# Patient Record
(demographics unavailable — no encounter records)

---

## 2025-05-12 NOTE — HISTORY OF PRESENT ILLNESS
[FreeTextEntry1] : This is a 54 year old female s/p left u/s biopsy on2017 for 2 new hypoechoic lesions. Pathology of left 12:00 N2 was a fibroadenoma, adenosis and CCC. Pathology of 12:00 N3 showed CCC, CC hyperplasia and PASH. Patient s/p right ebb 11/19/15 pathology shows benign breast tissue with healing biopsy site, adenosis and ccc, no atypia.  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 She just moved to [place] from [place]. She had her bx on 7/2/15 at [facility]. Pathology showed a focus of ADH, FCC, florid ductal hyperplasia, CCC, no in situ or invasive malignancy. She had bilateral screening mammogram 11 and then did not have imaging again until 2015. During her 6/30/15 right diagnostic mammogram in the right breast 11:00 4.5cm FN there were clustered and segmental calcifications and biopsy was recommended. In 2020 right 1:00 MR abnormal enhancement was seen. This was not biopsied due to insurance reasons and follow up MRI was normal.  She is s/p right LOQ posterior (stoplight clip)  and anterior with calcifications, pathology showed posterior  Ductal Carcinoma in-situ, nuclear grade 2, cribriform pattern with necrosis and rare microcalcification spanning at least 5mm, and CCC with calcification. ER positive, VA positive anterior site was negative (barbell clip). She is s/p Right breast upper outer quadrant stereotactic biopsy (tophat clip) 2022, pathology showed Radial scar, microcalcification associated with CCC, and duct ectasia.  She is s/p bilateral nipple delay 2022 pathology is benign. She had postop rash along areas of adhesive and now along IMF incisions.  She is s/p bilateral NSM and TE (3/11/2022) pathology showed right LOQ DCIS 4cm, grade 2, <1mm anterior-superior and posterior margins ER positive %, VA 1-10% weak to moderate staining; left CPM benign. She is had implant exchange . She is s/p implant exchange with fat transfer with Dr. Bonds 22.  She was adopted but recently found her biological mother via [employer] testing, she will meet her mother 2025 in [place] - she also has a personal history of breast cancer at 50s.  She does SBE.  She has not noticed a change in her breast or a breast lump.  She has not noticed a change in her nipple or nipple area. She has not noticed a change in the skin of the breast. She is not experiencing nipple discharge. She is not experiencing breast pain.  She has not noticed a lump or lymph node under the armpit.   BREAST CANCER RISK FACTORS Menarche:  12 Date of LMP: spring 2022  Menopause: chivo Grav:  2    Para:  2 (15, 12) Age at first live birth: 38 Nursed: yes Hysterectomy: no Oophorectomy: no OCP: y 10y HRT: no Last pap/pelvic exam: 2024 WNL  Related family history: adopted, biologic mother breast cancer 50s Ashkenazi: unknown Tyrer-Daron/NCI lifetime risk: TCv7 42.0%, TC v6 39%, BRCAPRO 11.2%, Mahsa 28.1%   BRCA testin invitae negative  Bra size: 34A  Last mammogram: 2021 right (bilat 2021)    Location: [place] Report reviewed.                                                            Images reviewed On PACS Results: Birads 4B Extremely dense breast tissue. Fine pleomorphic are present at the anterior central lower outer aspect of the right breast and amorphous microcalcifications are present at the posterior lower aspect of the right breast. Representative stereotactic biopsy is recommended at these sites to determine histology and exclude malignancy.  Additional microcalcifications are present at the UOA of the right breast that are likely benign.    Last ultrasound: 2021             Location: [place] Report reviewed.                                 Images reviewed. Results: Birads 2 no sonographic evidence of malignancy bilaterally.  Last MRI: 2022          Location: [facility] Report reviewed.                 Results: BIRADS 6 At the lower outer aspect of the right breast at the site of biopsy showing DCIA, there is a metallic marker and patchy enhancement consistent with postbiopsy change and pathology findings. Further surgical consultation is advised.  At the site of recent stereo biopsy at the UO aspect of the right breast postbiopsy hematoma and postbiopsy change is present.  No additional suspicious area of abnormal enhancement in the remainders of the breast bilaterally. 
[FreeTextEntry1] : This is a 54 year old female s/p left u/s biopsy on2017 for 2 new hypoechoic lesions. Pathology of left 12:00 N2 was a fibroadenoma, adenosis and CCC. Pathology of 12:00 N3 showed CCC, CC hyperplasia and PASH. Patient s/p right ebb 11/19/15 pathology shows benign breast tissue with healing biopsy site, adenosis and ccc, no atypia.  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 She just moved to [place] from [place]. She had her bx on 7/2/15 at [facility]. Pathology showed a focus of ADH, FCC, florid ductal hyperplasia, CCC, no in situ or invasive malignancy. She had bilateral screening mammogram 11 and then did not have imaging again until 2015. During her 6/30/15 right diagnostic mammogram in the right breast 11:00 4.5cm FN there were clustered and segmental calcifications and biopsy was recommended. In 2020 right 1:00 MR abnormal enhancement was seen. This was not biopsied due to insurance reasons and follow up MRI was normal.  She is s/p right LOQ posterior (stoplight clip)  and anterior with calcifications, pathology showed posterior  Ductal Carcinoma in-situ, nuclear grade 2, cribriform pattern with necrosis and rare microcalcification spanning at least 5mm, and CCC with calcification. ER positive, TX positive anterior site was negative (barbell clip). She is s/p Right breast upper outer quadrant stereotactic biopsy (tophat clip) 2022, pathology showed Radial scar, microcalcification associated with CCC, and duct ectasia.  She is s/p bilateral nipple delay 2022 pathology is benign. She had postop rash along areas of adhesive and now along IMF incisions.  She is s/p bilateral NSM and TE (3/11/2022) pathology showed right LOQ DCIS 4cm, grade 2, <1mm anterior-superior and posterior margins ER positive %, TX 1-10% weak to moderate staining; left CPM benign. She is had implant exchange . She is s/p implant exchange with fat transfer with Dr. Bonds 22.  She was adopted but recently found her biological mother via [employer] testing, she will meet her mother 2025 in [place] - she also has a personal history of breast cancer at 50s.  She does SBE.  She has not noticed a change in her breast or a breast lump.  She has not noticed a change in her nipple or nipple area. She has not noticed a change in the skin of the breast. She is not experiencing nipple discharge. She is not experiencing breast pain.  She has not noticed a lump or lymph node under the armpit.   BREAST CANCER RISK FACTORS Menarche:  12 Date of LMP: spring 2022  Menopause: chivo Grav:  2    Para:  2 (15, 12) Age at first live birth: 38 Nursed: yes Hysterectomy: no Oophorectomy: no OCP: y 10y HRT: no Last pap/pelvic exam: 2024 WNL  Related family history: adopted, biologic mother breast cancer 50s Ashkenazi: unknown Tyrer-Daron/NCI lifetime risk: TCv7 42.0%, TC v6 39%, BRCAPRO 11.2%, Mahsa 28.1%   BRCA testin invitae negative  Bra size: 34A  Last mammogram: 2021 right (bilat 2021)    Location: [place] Report reviewed.                                                            Images reviewed On PACS Results: Birads 4B Extremely dense breast tissue. Fine pleomorphic are present at the anterior central lower outer aspect of the right breast and amorphous microcalcifications are present at the posterior lower aspect of the right breast. Representative stereotactic biopsy is recommended at these sites to determine histology and exclude malignancy.  Additional microcalcifications are present at the UOA of the right breast that are likely benign.    Last ultrasound: 2021             Location: [place] Report reviewed.                                 Images reviewed. Results: Birads 2 no sonographic evidence of malignancy bilaterally.  Last MRI: 2022          Location: [facility] Report reviewed.                 Results: BIRADS 6 At the lower outer aspect of the right breast at the site of biopsy showing DCIA, there is a metallic marker and patchy enhancement consistent with postbiopsy change and pathology findings. Further surgical consultation is advised.  At the site of recent stereo biopsy at the UO aspect of the right breast postbiopsy hematoma and postbiopsy change is present.  No additional suspicious area of abnormal enhancement in the remainders of the breast bilaterally. 
[FreeTextEntry1] : Mr. Miller is a 57 year old male here for follow up evaluation.\par \par On August 31, 2018 he presented to the [place] emergency room with bilateral shoulder pain and chest pain. The pain was initially felt while on a treadmill. He was found to have positive cardiac enzymes, and was transferred to [facility] for cardiac cath.\par He was found to have a 99% lesion of his mid left circumflex and is status post balloon angioplasty and stent. He was also noted to have diffuse mild CAD and a 40% stenosis of his RPLS.\par Echocardiogram at this time demonstrated no significant valvular pathology, with normal left ventricular systolic function. He continues to have normal LV function as of 2/2019.\par \par I last saw him in 2019.\par He is feeling much better. He has no significant chest pain or dyspnea. He is going on hikes without issue.\par He had a single episode of atypical chest pain in 8/2019, and had an exercise nuclear stress test that was unremarkable. \par He denies shortness of breath, or any pain similar to the pain that brought him to the hospital. His palpitations have resolved. He has no orthopnea, PND, lightheadedness, dizziness or near syncope.

## 2025-05-12 NOTE — ASSESSMENT
[FreeTextEntry1] : 55 yo female with h/o right breast DCIS, ER+ and right palp node right axillary sonogram discussed post mx screening options in future I am not recommending mri at this time f/u 1 year discussed MRI as form of implant screening in future She knows to call or return sooner should any concerns or questions arise.

## 2025-05-12 NOTE — CONSULT LETTER
[Dear  ___] : Dear  [unfilled], [Courtesy Letter:] : I had the pleasure of seeing your patient, [unfilled], in my office today. [Please see my note below.] : Please see my note below. [Consult Closing:] : Thank you very much for allowing me to participate in the care of this patient.  If you have any questions, please do not hesitate to contact me. [Sincerely,] : Sincerely, [DrLynette  ___] : Dr. CRISTOBAL [FreeTextEntry3] : Elena Arellano MS DO\par  Breast Surgeon\par  [facility] \[place] [address]\par

## 2025-05-12 NOTE — CONSULT LETTER
[Dear  ___] : Dear  [unfilled], [Courtesy Letter:] : I had the pleasure of seeing your patient, [unfilled], in my office today. [Please see my note below.] : Please see my note below. [Consult Closing:] : Thank you very much for allowing me to participate in the care of this patient.  If you have any questions, please do not hesitate to contact me. [Sincerely,] : Sincerely, [DrLynette  ___] : Dr. CRISTOBAL [FreeTextEntry3] : Elena Arellano MS DO\par  Breast Surgeon\par  [facility] \[place] [address]\par

## 2025-05-12 NOTE — PHYSICAL EXAM
[Normocephalic] : normocephalic [Atraumatic] : atraumatic [Supple] : supple [No Supraclavicular Adenopathy] : no supraclavicular adenopathy [Examined in the supine and seated position] : examined in the supine and seated position [Symmetrical] : symmetrical [No dominant masses] : no dominant masses in right breast  [No dominant masses] : no dominant masses left breast [No Nipple Retraction] : no left nipple retraction [No Nipple Discharge] : no left nipple discharge [No Axillary Lymphadenopathy] : no left axillary lymphadenopathy [No Edema] : no edema [No Rashes] : no rashes [No Ulceration] : no ulceration [de-identified] : s/p NSM and implant placement, no collections, skin and nac viable [de-identified] : s/p NSM and implant, soft, in place, nac viable [de-identified] : palp soft mobile node

## 2025-05-12 NOTE — ASSESSMENT
[FreeTextEntry1] : 53 yo female with h/o right breast DCIS, ER+ and right palp node right axillary sonogram discussed post mx screening options in future I am not recommending mri at this time f/u 1 year discussed MRI as form of implant screening in future She knows to call or return sooner should any concerns or questions arise.

## 2025-05-12 NOTE — PHYSICAL EXAM
[Normocephalic] : normocephalic [Atraumatic] : atraumatic [Supple] : supple [No Supraclavicular Adenopathy] : no supraclavicular adenopathy [Examined in the supine and seated position] : examined in the supine and seated position [Symmetrical] : symmetrical [No dominant masses] : no dominant masses in right breast  [No dominant masses] : no dominant masses left breast [No Nipple Retraction] : no left nipple retraction [No Nipple Discharge] : no left nipple discharge [No Axillary Lymphadenopathy] : no left axillary lymphadenopathy [No Edema] : no edema [No Rashes] : no rashes [No Ulceration] : no ulceration [de-identified] : s/p NSM and implant placement, no collections, skin and nac viable [de-identified] : s/p NSM and implant, soft, in place, nac viable [de-identified] : palp soft mobile node